# Patient Record
Sex: FEMALE | Race: WHITE | URBAN - METROPOLITAN AREA
[De-identification: names, ages, dates, MRNs, and addresses within clinical notes are randomized per-mention and may not be internally consistent; named-entity substitution may affect disease eponyms.]

---

## 2018-08-03 ENCOUNTER — HISTORICAL (OUTPATIENT)
Dept: LAB | Facility: HOSPITAL | Age: 68
End: 2018-08-03

## 2021-07-21 ENCOUNTER — HOSPITAL ENCOUNTER (OUTPATIENT)
Dept: NUTRITION | Facility: HOSPITAL | Age: 71
End: 2021-07-23
Attending: INTERNAL MEDICINE | Admitting: INTERNAL MEDICINE

## 2021-07-21 LAB
ABS NEUT (OLG): 6.47 X10(3)/MCL (ref 2.1–9.2)
ALBUMIN SERPL-MCNC: 3.9 GM/DL (ref 3.4–4.8)
ALBUMIN/GLOB SERPL: 1.1 RATIO (ref 1.1–2)
ALP SERPL-CCNC: 74 UNIT/L (ref 40–150)
ALT SERPL-CCNC: 15 UNIT/L (ref 0–55)
AST SERPL-CCNC: 22 UNIT/L (ref 5–34)
BASOPHILS # BLD AUTO: 0 X10(3)/MCL (ref 0–0.2)
BASOPHILS NFR BLD AUTO: 0 %
BILIRUB SERPL-MCNC: 0.5 MG/DL
BILIRUBIN DIRECT+TOT PNL SERPL-MCNC: 0.2 MG/DL (ref 0–0.5)
BILIRUBIN DIRECT+TOT PNL SERPL-MCNC: 0.3 MG/DL (ref 0–0.8)
BUN SERPL-MCNC: 14.1 MG/DL (ref 9.8–20.1)
CALCIUM SERPL-MCNC: 9.8 MG/DL (ref 8.4–10.2)
CHLORIDE SERPL-SCNC: 103 MMOL/L (ref 98–107)
CO2 SERPL-SCNC: 27 MMOL/L (ref 23–31)
CREAT SERPL-MCNC: 0.87 MG/DL (ref 0.55–1.02)
ERYTHROCYTE [DISTWIDTH] IN BLOOD BY AUTOMATED COUNT: 13 % (ref 11.5–17)
GLOBULIN SER-MCNC: 3.5 GM/DL (ref 2.4–3.5)
GLUCOSE SERPL-MCNC: 112 MG/DL (ref 82–115)
HCT VFR BLD AUTO: 41.6 % (ref 37–47)
HGB BLD-MCNC: 13.3 GM/DL (ref 12–16)
LYMPHOCYTES # BLD AUTO: 1.1 X10(3)/MCL (ref 0.6–4.6)
LYMPHOCYTES NFR BLD AUTO: 13 %
MCH RBC QN AUTO: 29.4 PG (ref 27–31)
MCHC RBC AUTO-ENTMCNC: 32 GM/DL (ref 33–36)
MCV RBC AUTO: 92 FL (ref 80–94)
MONOCYTES # BLD AUTO: 0.4 X10(3)/MCL (ref 0.1–1.3)
MONOCYTES NFR BLD AUTO: 4 %
NEUTROPHILS # BLD AUTO: 6.47 X10(3)/MCL (ref 2.1–9.2)
NEUTROPHILS NFR BLD AUTO: 82 %
PLATELET # BLD AUTO: 208 X10(3)/MCL (ref 130–400)
PMV BLD AUTO: 10.5 FL (ref 9.4–12.4)
POTASSIUM SERPL-SCNC: 5.4 MMOL/L (ref 3.5–5.1)
PROT SERPL-MCNC: 7.4 GM/DL (ref 5.8–7.6)
RBC # BLD AUTO: 4.52 X10(6)/MCL (ref 4.2–5.4)
RESTICK: NORMAL
SARS-COV-2 AG RESP QL IA.RAPID: NEGATIVE
SODIUM SERPL-SCNC: 142 MMOL/L (ref 136–145)
WBC # SPEC AUTO: 7.9 X10(3)/MCL (ref 4.5–11.5)

## 2021-07-22 LAB
ABS NEUT (OLG): 3.95 X10(3)/MCL (ref 2.1–9.2)
BASOPHILS # BLD AUTO: 0 X10(3)/MCL (ref 0–0.2)
BASOPHILS NFR BLD AUTO: 0 %
BUN SERPL-MCNC: 10.3 MG/DL (ref 9.8–20.1)
CALCIUM SERPL-MCNC: 9 MG/DL (ref 8.4–10.2)
CHLORIDE SERPL-SCNC: 108 MMOL/L (ref 98–107)
CO2 SERPL-SCNC: 26 MMOL/L (ref 23–31)
CREAT SERPL-MCNC: 0.81 MG/DL (ref 0.55–1.02)
CREAT/UREA NIT SERPL: 13
EOSINOPHIL # BLD AUTO: 0 X10(3)/MCL (ref 0–0.9)
EOSINOPHIL NFR BLD AUTO: 0 %
ERYTHROCYTE [DISTWIDTH] IN BLOOD BY AUTOMATED COUNT: 13.2 % (ref 11.5–17)
GLUCOSE SERPL-MCNC: 85 MG/DL (ref 82–115)
HCT VFR BLD AUTO: 39 % (ref 37–47)
HGB BLD-MCNC: 12.3 GM/DL (ref 12–16)
LYMPHOCYTES # BLD AUTO: 1.4 X10(3)/MCL (ref 0.6–4.6)
LYMPHOCYTES NFR BLD AUTO: 24 %
MCH RBC QN AUTO: 29.1 PG (ref 27–31)
MCHC RBC AUTO-ENTMCNC: 31.5 GM/DL (ref 33–36)
MCV RBC AUTO: 92.2 FL (ref 80–94)
MONOCYTES # BLD AUTO: 0.5 X10(3)/MCL (ref 0.1–1.3)
MONOCYTES NFR BLD AUTO: 8 %
NEUTROPHILS # BLD AUTO: 3.95 X10(3)/MCL (ref 2.1–9.2)
NEUTROPHILS NFR BLD AUTO: 66 %
PLATELET # BLD AUTO: 204 X10(3)/MCL (ref 130–400)
PMV BLD AUTO: 11 FL (ref 9.4–12.4)
POTASSIUM SERPL-SCNC: 3.8 MMOL/L (ref 3.5–5.1)
RBC # BLD AUTO: 4.23 X10(6)/MCL (ref 4.2–5.4)
SODIUM SERPL-SCNC: 143 MMOL/L (ref 136–145)
WBC # SPEC AUTO: 5.9 X10(3)/MCL (ref 4.5–11.5)

## 2021-09-08 ENCOUNTER — HISTORICAL (OUTPATIENT)
Dept: HEPATOLOGY | Facility: HOSPITAL | Age: 71
End: 2021-09-08

## 2021-09-08 LAB — POC CREATININE: 1 MG/DL (ref 0.6–1.3)

## 2022-04-30 NOTE — CONSULTS
DATE OF CONSULTATION:      ATTENDING PHYSICIAN:  Varsha Dos Santos MD  CONSULTING PHYSICIAN:  Joseph Christianson MD    REASON FOR CONSULT:  Vertigo.    HISTORY OF PRESENT ILLNESS:  This is a 70-year-old female who presented to the emergency room yesterday with severe dizziness, nausea, vomiting, ringing in the ears and some diarrhea.  She reports a longtime history of having motion sickness issues but most recently began having short episodes of spinning.  She does report that when she rolls over in bed she has some spinning sensation.    PAST MEDICAL HISTORY:  Hyperlipidemia, vitamin D deficiency, and arthritis.    PAST SURGICAL HISTORY:  Wide local excision of basal cell of the forehead.    ALLERGIES:  Iodine and sulfa drugs.    SOCIAL HISTORY:  Nondrug user, nontobacco user, non alcohol user.    FAMILY HISTORY:  Noncontributory.    REVIEW OF SYSTEMS:  Negative except for as above.    PHYSICAL EXAMINATION:  GENERAL:  In no apparent distress.   HEENT:  Cerumen impaction, right ear.  Left ear TM and EACs are clear.  Nasal cavity is clear.  Oropharynx, oral cavity is clear.   NECK:  Trachea in the midline.  No masses.     VESTIBULAR EXAM:  [place]-Hallpike is positive on the right side.    ASSESSMENT:  A 70-year-old female with vertigo and dizziness.    PLAN:    1. I performed an Epley maneuver at the bedside and instructed her to sleep with her head elevated for tonight.  She is also to follow up with me in my office for repeat Isabel-Hallpike to ensure that we resolved the problem.  2. I am not completely positive that her benign positional vertigo explains all of her symptoms, but     certainly from the ENT perspective this should be managed as an outpatient.  I will see her back in my office in 1-2 weeks for further evaluation.        ______________________________  Joseph Christianson MD JD/  DD:  07/22/2021  Time:  06:32PM  DT:  07/22/2021  Time:  07:09PM  Job #:  [id number]

## 2022-04-30 NOTE — ED PROVIDER NOTES
Patient:   Lety Campos            MRN: [id number]            FIN: [id number]               Age:   70 years     Sex:  Female     :  1950   Associated Diagnoses:   Vertigo; Vomiting   Author:   Zaid Morales MD      Basic Information   Time seen: Date 2021, Immediately upon arrival.   History source: Patient.   Arrival mode: Ambulance.   History limitation: None.   Additional information: Chief Complaint from Nursing Triage Note : Chief Complaint   2021 13:14 CDT      Chief Complaint           presents from home via [facility] c/o dizziness, nausea, vomitting, and diarrha since this Am. denies abd pain. HX of vertigo.  enroute. 4 mg IV zofran given by EMS.  .      History of Present Illness   As patient presents with severe dizziness.  States she awoke at 5:30, severe dizziness unable to stand.  Significant nausea vomiting with it and one episode loose bowels.  Patient denies headache.  States she also has ringing in ears.  Patient states that she has a history of vertigo, but never to the point where she's had the finger to the hospital.  The patient denies chest pain, shortness of breath or abdominal pain.      Review of Systems   Constitutional symptoms:  Negative except as documented in HPI.   Skin symptoms:  Negative except as documented in HPI.   Eye symptoms:  Negative except as documented in HPI.   ENMT symptoms:  Negative except as documented in HPI.   Respiratory symptoms:  Negative except as documented in HPI.   Cardiovascular symptoms:  Negative except as documented in HPI.   Gastrointestinal symptoms:  Negative except as documented in HPI.   Musculoskeletal symptoms:  Negative except as documented in HPI.   Neurologic symptoms:  Negative except as documented in HPI.   Hematologic/Lymphatic symptoms:  Negative except as documented in HPI.             Additional review of systems information: All other systems reviewed and otherwise negative.      Health Status    Allergies:    Allergic Reactions (Selected)  Severity Not Documented  Iodine- Unknown.  Sulfa drugs- Unknown..      Past Medical/ Family/ Social History   Medical history: Fibromyalgia, osteoarthritis.   Surgical history:    No active procedure history items have been selected or recorded..   Family history: Not significant.      Physical Examination               Vital Signs   Vital Signs   7/21/2021 17:25 CDT      Temperature Temporal Artery               37.5 DegC                             Peripheral Pulse Rate     53 bpm  LOW                             Respiratory Rate          15 br/min                             SpO2                      93 %  LOW                             Oxygen Therapy            Room air                             Systolic Blood Pressure   123 mmHg                             Diastolic Blood Pressure  63 mmHg                             Mean Arterial Pressure, Cuff              83 mmHg                             Blood Pressure Location   Left arm                             Blood Pressure Cuff Size  Adult long    7/21/2021 16:25 CDT      Peripheral Pulse Rate     60 bpm                             Respiratory Rate          15 br/min                             SpO2                      95 %                             Oxygen Therapy            Room air                             Systolic Blood Pressure   117 mmHg                             Diastolic Blood Pressure  65 mmHg                             Mean Arterial Pressure, Cuff              82 mmHg                             Blood Pressure Location   Left arm                             Blood Pressure Cuff Size  Adult long    7/21/2021 15:25 CDT      Peripheral Pulse Rate     53 bpm  LOW                             Respiratory Rate          14 br/min                             SpO2                      96 %                             Oxygen Therapy            Room air                             Systolic Blood Pressure   109  mmHg                             Diastolic Blood Pressure  63 mmHg                             Mean Arterial Pressure, Cuff              78 mmHg                             Blood Pressure Location   Left arm                             Blood Pressure Cuff Size  Adult long    7/21/2021 14:25 CDT      Temperature Temporal Artery               36.6 DegC                             Peripheral Pulse Rate     50 bpm  LOW                             Respiratory Rate          14 br/min                             SpO2                      97 %                             Oxygen Therapy            Room air                             Systolic Blood Pressure   133 mmHg                             Diastolic Blood Pressure  71 mmHg                             Mean Arterial Pressure, Cuff              92 mmHg                             Blood Pressure Location   Left arm                             Blood Pressure Cuff Size  Adult long    7/21/2021 13:25 CDT      Peripheral Pulse Rate     51 bpm  LOW                             Respiratory Rate          15 br/min                             SpO2                      97 %                             Oxygen Therapy            Room air                             Systolic Blood Pressure   135 mmHg                             Diastolic Blood Pressure  73 mmHg                             Mean Arterial Pressure, Cuff              94 mmHg                             Blood Pressure Location   Left arm                             Blood Pressure Cuff Size  Adult long    7/21/2021 13:14 CDT      Temperature Temporal Artery               36.2 DegC  LOW                             Peripheral Pulse Rate     53 bpm  LOW                             Respiratory Rate          14 br/min                             SpO2                      96 %                             Oxygen Therapy            Room air                             Systolic Blood Pressure   146 mmHg  HI                              Diastolic Blood Pressure  66 mmHg  .   Oxygen saturation.   General:  Alert, mild distress, Eyes closed.    Skin:  Warm, dry.    Head:  Normocephalic, atraumatic.    Neck:  Supple, trachea midline, no JVD, no carotid bruit.    Eye:  Pupils are equal, round and reactive to light, extraocular movements are intact, normal conjunctiva, vision unchanged.    Ears, nose, mouth and throat:  Tympanic membranes clear, oral mucosa moist, no pharyngeal erythema or exudate.    Cardiovascular:  Regular rate and rhythm, No murmur, Normal peripheral perfusion.    Respiratory:  Lungs are clear to auscultation, breath sounds are equal.    Gastrointestinal:  Soft, Nontender, Non distended, Normal bowel sounds.    Back:  Nontender, Normal range of motion.    Musculoskeletal:  Normal ROM, normal strength, no tenderness, no swelling.    Neurological:  Alert and oriented to person, place, time, and situation, No focal neurological deficit observed, CN II-XII intact, normal sensory observed, normal motor observed, Unable to ambulate patient, fully test coordination because of her vertigo, Speech: Normal.    Lymphatics:  No lymphadenopathy.   Psychiatric:  Cooperative, appropriate mood & affect.       Medical Decision Making   Documents reviewed:  Emergency department nurses' notes.   Results review:  Lab results : Lab View   7/21/2021 14:29 CDT      Sodium Lvl                142 mmol/L                             Potassium Lvl             5.4 mmol/L  HI                             Chloride                  103 mmol/L                             CO2                       27 mmol/L                             Calcium Lvl               9.8 mg/dL                             Glucose Lvl               112 mg/dL                             BUN                       14.1 mg/dL                             Creatinine                0.87 mg/dL                             eGFR-AA                   >60  NA                             eGFR-PALOMA                   >60 mL/min/1.73 m2  NA                             Bili Total                0.5 mg/dL                             Bili Direct               0.2 mg/dL                             Bili Indirect             0.30 mg/dL                             AST                       22 unit/L                             ALT                       15 unit/L                             Alk Phos                  74 unit/L                             Total Protein             7.4 gm/dL                             Albumin Lvl               3.9 gm/dL                             Globulin                  3.5 gm/dL                             A/G Ratio                 1.1 ratio  .   Radiology results:  Rad Results (ST)  < 12 hrs   Accession: [id number]  Order: MRI Brain Limited W/O Contrast  Report Dt/Tm: 07/21/2021 17:20  Report:   EXAM: MRI Brain Limited W/O Contrast     HISTORY: Dizziness , vertigo     COMPARISON STUDIES: None     TECHNIQUE:   Limited MR images of the brain were obtained without contrast,  including DWI and flair axial images.     DISCUSSION:  There is no restricted diffusion. There are mild scattered T2/flair  hyperintensities in the subcortical and periventricular white matter  and [place], nonspecific but most likely representing chronic  microvascular ischemic changes. There is no mass effect or midline  shift. The basal cisterns are patent. There is mild diffuse  parenchymal volume loss. There is no abnormal extra-axial fluid  collection.        IMPRESSION:   1.  No acute infarct identified.  2.  Mild chronic microvascular ischemic changes.    .      Reexamination/ Reevaluation   Patient's MRI is unremarkable for acute infarct, but after multiple doses of medications still can't control her nausea and vertigo.  We'll admit      Impression and Plan   Diagnosis   Vertigo ([id number] R42)   Vomiting (QEK38-BR R11.10)      Calls-Consults   -  Hospitalist.   Plan   Condition: Stable.    Disposition: Admit time   7/21/2021 17:46:00, Place in Observation Unit.    Counseled: Patient, Family, Regarding diagnosis, Regarding diagnostic results, Regarding treatment plan, Patient indicated understanding of instructions.

## 2022-05-04 NOTE — HISTORICAL [facility] CERNER
This is a historical note converted from [employer]. Formatting and pictures may have been removed.  Please reference [employer] for original formatting and attached multimedia. Admit and Discharge Dates  Admit Date: 07/21/2021  Discharge Date: 07/23/2021  Physicians  Attending Physician - Levon CHONG, Varsha  Admitting Physician - Levon CHONG, Varsha  Consulting Physician - Meghan CHONG, Joseph QUINONEZ  Primary Care Physician - No PCP, No  Discharge Diagnosis  1.?Vertigo?R42  2.?Bradycardia?R00.1  3.?Impacted cerumen of right ear?H61.21  4.?HLD (hyperlipidemia)?E78.5  Hospital Course  Ms. Morris is a 70-year-old white female?who presented to the ED?today with severe dizziness, nausea, vomiting,?tinnitus, and one episode of loose stool.? Patient has a history of?motion sickness?mainly when riding in?the backseat or in an airplane.?However, patient states that over the last month, she has been having vertigo episodes.? Patient states the episodes start with her left ear feeling stopped up,?then bilateral tinnitus,?a feeling of moderate sinus congestion, then dizziness?described as room spinning.? Patient states that she has been taking meclizine with some relief of symptoms, but took it today without relief. ?Patient denies chest pain?or shortness of breath.? ED work-up was only significant for K+?5.4. ?MRI brain WO was negative.? In the ED?the patient had no relief with antiemetics.? Patient was?admitted to the hospital medicine service?for management.? Bilateral carotid Doppler US and echocardiogram were both unremarkable. ?Pt was evaluated by ENT who performed an Epley maneuver at the bedside and instructed?pt to sleep with her head elevated for tonight. ?She is also to follow up in the office for repeat Isabel-Hallpike to ensure that we resolved the problem.??Pt is able to tolerate a diet and ambulate.??Pt is afebrile, on room air, and hemodynamically stable for discharge.  Significant Findings  Accession:?[id number]  Order:?MRI Brain  Limited W/O Contrast  Report Dt/Tm:?07/21/2021 17:20  Report:?  EXAM: MRI Brain Limited W/O Contrast  ?  HISTORY: Dizziness , vertigo  ?  COMPARISON STUDIES: None  ?  TECHNIQUE:?  Limited MR images of the brain were obtained without contrast,  including DWI and flair axial images.  ?  DISCUSSION:  There is no restricted diffusion. There are mild scattered T2/flair  hyperintensities in the subcortical and periventricular white matter  and [place], nonspecific but most likely representing chronic  microvascular ischemic changes. There is no mass effect or midline  shift. The basal cisterns are patent. There is mild diffuse  parenchymal volume loss. There is no abnormal extra-axial fluid  collection.  ?  ?  IMPRESSION:?  1. ?No acute infarct identified.  2. ?Mild chronic microvascular ischemic changes.  ?  Time Spent on discharge  35 minutes  Objective  Vitals & Measurements  T:?36.3? ?C (Oral)? TMIN:?36.3? ?C (Oral)? TMAX:?36.6? ?C (Oral)? HR:?58(Peripheral)? RR:?18? BP:?159/76? SpO2:?94%?  Physical Exam  General:?obese WF?in no acute distress  Eye: PERRL, EOMI, clear conjunctiva, eyelids normal  HENT: cerumen impaction, right ear, [place]-Hallpike is positive on the right side  Neck: full range of motion, no thyromegaly or lymphadenopathy  Respiratory:?clear to auscultation bilaterally  Cardiovascular:?regular rate and rhythm  Gastrointestinal:?soft, non-tender, non-distended  Musculoskeletal:?full range of motion of all extremities without limitation or discomfort  Integumentary: no rashes or skin lesions present  Neurologic: cranial nerves grossly intact, no signs of peripheral neurological deficit  Psychiatric: flat affect  Patient Discharge Condition  stable  Discharge Disposition  home   Discharge Medication Reconciliation  Continue  calcium carbonate (calcium carbonate 600 mg oral tablet)?1,200 mg, Oral, Daily  cholecalciferol (Vitamin D3 2000 intl units (50 mcg) oral capsule)?50 mcg, Oral, Daily  latanoprost  ophthalmic (latanoprost 0.005% ophthalmic solution)?1 drop(s), Eye-Both, At Bedtime  meclizine (meclizine 25 mg oral tablet)?25 mg, Oral, TID, PRN for dizziness  naproxen (naproxen 125 mg oral tablet), BID  pravastatin (Pravastatin 40 mg Oral Tab)?40 mg, Oral, At Bedtime  Education and Orders Provided  Discharge - 07/23/21 10:36:00 CDT, Home?  Discharge Activity - Activity as Tolerated?  Discharge Diet - Regular?  Follow up  Joseph Christianson, within 1 to 2 weeks  Report to Emergency Department if symptoms return or worsen

## 2022-05-04 NOTE — HISTORICAL [facility] CERNER
This is a historical note converted from Kavin. Formatting and pictures may have been removed.  Please reference Kavin for original formatting and attached multimedia. History of Present Illness  Ms. Morris is a 70-year-old female?who presented to the ED?today with severe dizziness, nausea, vomiting,?tinnitus, and one episode of loose stool.? Patient also?complains of?tinnitus.? Patient has a history of?motion sickness?mainly when riding in?the backseat or in an airplane.?However, patient states that over the last month, she has been having vertigo episodes.? Patient states the episodes start with her left ear feeling stopped up,?then bilateral tinnitus,?a feeling of moderate sinus congestion, then dizziness?described as room spinning.? Patient states that she has been taking Meclizine with some relief of symptoms, but took it today without relief. ?Patient denies chest pain?or shortness of breath.? ED work-up was only significant for K+?5.4. ?MRI brain WO was negative.? In the ED?the patient had no relief with antiemetics.? Patient was?admitted to hospital medicine for management.  ?  Review of Systems  Except as documented all systems reviewed and negative.  Physical Exam  Vitals & Measurements  T:?36.2? ?C (Oral)? TMIN:?36.2? ?C (Oral)? TMAX:?37.5? ?C (Temporal Artery)? HR:?51(Peripheral)? RR:?18? BP:?146/77? SpO2:?94%? WT:?77.7?kg? BMI:?28.51?  General:?Pleasant, appears uncomfortable, in no acute distress  Eye: PERRL, clear conjunctiva, no overt nystagmus noted  HENT:,Atraumatic, normocephalic,?oropharynx and nasal mucosal surfaces moist  Neck: supple, full ROM  Respiratory:?No respiratory distress, no stridor, Lungs CTA bilaterally  Cardiovascular:?regular rate and rhythm without murmurs, gallops or rubs  Gastrointestinal:?soft, non-tender, non-distended with normal bowel sounds  Genitourinary: no CVA tenderness to palpation  Musculoskeletal:?full range of motion of all extremities  Integumentary: warm and  dry  Neurologic: cranial nerves intact, no signs of peripheral neurological deficit, motor/sensory function intact  Psychiatric: cooperative, appropriate mood and affect  Cognition and Speech: clear and coherent  ?  Assessment/Plan  1. Vertigo --- DDX BPPV, labyrinthitis  2. Nausea and vomiting due to above  ?   HX:?Motion sickness, hyperlipidemia  ?   PLAN:  -Meclizine TID  -Gentle hydration  -Antiemetics as needed.  -Resume home Meds as appropriate  -Labs in AM  -ENT outpatient f/u  ?  ?   DVT Prophylaxis: SCDs  GI Prophylaxis:? Protonix  ?   PCP:?  Code status: Full  ?   I, Juliane Sheikh NP have reviewed and discussed this case with Dr. Cordova.  Please see addendum for further assessment and plan from attending MD.  -------------------  Simran Cordova MD  I personally performed a face-to-face evaluation of this patient?on [7/21/2021 at 2300].?I?have reviewed?and agree?with the?nurse practitioners?history, physical exam and plan of care.  ?   Intermittent vertigo episode started?about a month ago, associated with ear fullness and tinnitus, no hearing loss. ?Was relieved by meclizine but?today was severe?and was not relieved by meclizine prompted her to come to the ED. has not seen ENT?previously.? On arrival to ED she was bradycardic otherwise hemodynamically stable.?  ?  Vertigo--- DDX?BPPV,?labyrinthitis,?Ménières disease  Bradycardia -- appears sinus on tele, EKG pending  ?  EKG, ECHO, CUS - pending  Continue?meclizine, antiemetics PRN  ENT follow-up outpatient   Problem List/Past Medical History  Motion sickness  Hyperlipidemia  Vitamin D deficiency  Osteoarthritis  Procedure/Surgical History  Forehead skin?lesion resection- basal cell carcinoma  Medications  Inpatient  Al hydrox./Mg hydrox./simethicone 400 mg-400 mg-40 mg/5 mL oral susp (Maalox Max) UD, 15 mL, Oral, q6hr, PRN  Colace 100 mg oral capsule, 100 mg= 1 cap(s), Oral, BID, PRN  diphenhydrAMINE ORAL, 25 mg= 1 cap(s), Oral, q4hr, PRN  Dulcolax Laxative 5  mg ORAL enteric coated tablet, 5 mg= 1 tab(s), Oral, Daily, PRN  hydrALAZINE 20 mg/mL injectable solution, 10 mg= 0.5 mL, IV, q3hr, PRN  latanoprost 0.005% ophthalmic solution, 1 drop(s), Eye-Both, At Bedtime  meclizine, 25 mg= 1 tab(s), Oral, TID  Milk of Magnesia, 30 mL, Oral, BID, PRN  Phenergan 25 mg Tab, 12.5 mg= 0.5 tab(s), Oral, q6hr, PRN  Protonix, 40 mg= 1 tab(s), Oral, Daily  simvastatin 20 mg oral tablet, 20 mg= 1 tab(s), Oral, Daily  Sodium Chloride 0.9% 1000mL 1,000 mL, 1000 mL, IV, Now  Tums 500, 500 mg= 1 tab(s), Chewed, TID, PRN  Tylenol 325 mg oral tablet, 650 mg= 2 tab(s), Oral, q4hr, PRN  Tylenol 325 mg oral tablet, 325 mg= 1 tab(s), Oral, q4hr, PRN  Vitamin D3, 2000 units= 2 tab(s), Oral, Daily  Zofran 2 mg/mL injectable solution, 4 mg= 2 mL, IV, q4hr, PRN  Home  calcium carbonate 600 mg oral tablet, 1200 mg= 2 tab(s), Oral, Daily  latanoprost 0.005% ophthalmic solution, 1 drop(s), Eye-Both, At Bedtime  meclizine 25 mg oral tablet, 25 mg= 1 tab(s), Oral, TID, PRN  naproxen 125 mg oral tablet, BID  Pravastatin 40 mg Oral Tab, 40 mg= 1 tab(s), Oral, At Bedtime  Vitamin D3 2000 intl units (50 mcg) oral capsule, 50 mcg= 1 cap(s), Oral, Daily  Allergies  iodine?(Unknown)  sulfa drugs?(Unknown)  Social History  Tobacco  Never (less than 100 in lifetime), No, 07/21/2021  Family History  Family history reviewed and is noncontributory.  Lab Results  Labs Last 24 Hours?  ?Chemistry? Hematology/Coagulation?   Sodium Lvl: 142 mmol/L (07/21/21 14:29:00) WBC: 7.9 x10(3)/mcL (07/21/21 17:42:00)   Potassium Lvl:?5.4 mmol/L?High (07/21/21 14:29:00) RBC: 4.52 x10(6)/mcL (07/21/21 17:42:00)   Chloride: 103 mmol/L (07/21/21 14:29:00) Hgb: 13.3 gm/dL (07/21/21 17:42:00)   CO2: 27 mmol/L (07/21/21 14:29:00) Hct: 41.6 % (07/21/21 17:42:00)   Calcium Lvl: 9.8 mg/dL (07/21/21 14:29:00) Platelet: 208 x10(3)/mcL (07/21/21 17:42:00)   Glucose Lvl: 112 mg/dL (07/21/21 14:29:00) MCV: 92 fL (07/21/21 17:42:00)   BUN: 14.1  mg/dL (07/21/21 14:29:00) MCH: 29.4 pg (07/21/21 17:42:00)   Creatinine: 0.87 mg/dL (07/21/21 14:29:00) MCHC:?32 gm/dL?Low (07/21/21 17:42:00)   Est Creat Clearance Ser: 54.92 mL/min (07/21/21 18:41:33) RDW: 13 % (07/21/21 17:42:00)   eGFR-AA: >60 (07/21/21 14:29:00) MPV: 10.5 fL (07/21/21 17:42:00)   eGFR-PALOMA: >60 (07/21/21 14:29:00) Abs Neut: 6.47 x10(3)/mcL (07/21/21 17:42:00)   Bili Total: 0.5 mg/dL (07/21/21 14:29:00) Neutro Auto: 82 % (07/21/21 17:42:00)   Bili Direct: 0.2 mg/dL (07/21/21 14:29:00) Lymph Auto: 13 % (07/21/21 17:42:00)   Bili Indirect: 0.3 mg/dL (07/21/21 14:29:00) Mono Auto: 4 % (07/21/21 17:42:00)   AST: 22 unit/L (07/21/21 14:29:00) Basophil Auto: 0 % (07/21/21 17:42:00)   ALT: 15 unit/L (07/21/21 14:29:00) Abs Neutro: 6.47 x10(3)/mcL (07/21/21 17:42:00)   Alk Phos: 74 unit/L (07/21/21 14:29:00) Abs Lymph: 1.1 x10(3)/mcL (07/21/21 17:42:00)   Total Protein: 7.4 gm/dL (07/21/21 14:29:00) Abs Mono: 0.4 x10(3)/mcL (07/21/21 17:42:00)   Albumin Lvl: 3.9 gm/dL (07/21/21 14:29:00) Abs Baso: 0 x10(3)/mcL (07/21/21 17:42:00)   Globulin: 3.5 gm/dL (07/21/21 14:29:00)    A/G Ratio: 1.1 ratio (07/21/21 14:29:00)    Restick: Done (07/21/21 17:42:00)    Diagnostic Results  Accession:?[id number]  Order:?MRI Brain Limited W/O Contrast  Report Dt/Tm:?07/21/2021 17:20  Report:?  EXAM: MRI Brain Limited W/O Contrast  ?  HISTORY: Dizziness , vertigo  ?  COMPARISON STUDIES: None  ?  TECHNIQUE:?  Limited MR images of the brain were obtained without contrast,  including DWI and flair axial images.  ?  DISCUSSION:  There is no restricted diffusion. There are mild scattered T2/flair  hyperintensities in the subcortical and periventricular white matter  and [place], nonspecific but most likely representing chronic  microvascular ischemic changes. There is no mass effect or midline  shift. The basal cisterns are patent. There is mild diffuse  parenchymal volume loss. There is no abnormal extra-axial  fluid  collection.  ?  ?  IMPRESSION:?  1. ?No acute infarct identified.  2. ?Mild chronic microvascular ischemic changes.  ?